# Patient Record
(demographics unavailable — no encounter records)

---

## 2025-01-30 NOTE — CARDIOLOGY SUMMARY
[Today's Date] : [unfilled] [de-identified] : 1/29/25 [FreeTextEntry1] : Sinus rhythm, rate 80 bpm, QRS axis +66 degrees, IA 0.13, QRS 0.10, QTc 0.43 seconds and is within normal limits for age. [de-identified] : 1/29/25 [FreeTextEntry2] : Summary:  1. No structural abnormalities seen. 2. Normal right ventricular morphology with qualitatively normal size and systolic function. 3. Normal left ventricular size, morphology and systolic function. 4. No pericardial effusion. [de-identified] : Drawn [de-identified] : Fasting lipid profile, CMP, CRP, LPA, homocysteine level

## 2025-01-30 NOTE — HISTORY OF PRESENT ILLNESS
[FreeTextEntry1] : I had the opportunity to examine Juwan, a 15-year-old [facility].  Diagnosis at the time was chest wall pain.  Electrocardiogram revealed a sinus rhythm with complete right bundle branch block.  Measurements of acute phase reactants and Troponin was normal.  RVP was normal.  The CBC demonstrated mild elevation of white count.  He is allergic CMP was within normal limits as well.  He is referred here for cardiac evaluation.  He was monitored in [place] after a full-term pregnancy normal spontaneous vaginal delivery with birth weight 7 pounds 5 ounces.  There were no  issues.  Father 56 in good health mother 33 in good health there is 2 siblings male ages 4 and 12 in good health.  There is a significant family history however of early death from myocardial infarction in maternal uncles. Maternal grandfather has had 3 myocardial infarctions and had a CABG 7 months ago.  Maternal aunt  at age 58 and his sleep and had an autopsy specifics are unknown but presumably a heart condition.  He is currently in 11th grade and is not athletic.  The ibuprofen given to him for his chest wall tenderness was effective but he no longer has any cardiovascular complaints.  There are no known allergies.

## 2025-01-30 NOTE — CLEARANCE
[May participate in all age appropriate activities.] : ~He/She~ may participate in all age appropriate activities

## 2025-01-30 NOTE — HISTORY OF PRESENT ILLNESS
[FreeTextEntry1] : I had the opportunity to examine Juwan, a 15-year-[facility].  Diagnosis at the time was chest wall pain.  Electrocardiogram revealed a sinus rhythm with complete right bundle branch block.  Measurements of acute phase reactants and Troponin was normal.  RVP was normal.  The CBC demonstrated mild elevation of white count.  He is allergic CMP was within normal limits as well.  He is referred here for cardiac evaluation.  He was monitored in [place] after a full-term pregnancy normal spontaneous vaginal delivery with birth weight 7 pounds 5 ounces.  There were no  issues.  Father 56 in good health mother 33 in good health there is 2 siblings male ages 4 and 12 in good health.  There is a significant family history however of early death from myocardial infarction in maternal uncles. Maternal grandfather has had 3 myocardial infarctions and had a CABG 7 months ago.  Maternal aunt  at age 58 and his sleep and had an autopsy specifics are unknown but presumably a heart condition.  He is currently in 11th grade and is not athletic.  The ibuprofen given to him for his chest wall tenderness was effective but he no longer has any cardiovascular complaints.  There are no known allergies.

## 2025-01-30 NOTE — CONSULT LETTER
[Today's Date] : [unfilled] [Name] : Name: [unfilled] [] : : ~~ [Today's Date:] : [unfilled] [Dear  ___:] : Dear Dr. [unfilled]: [Consult - Single Provider] : Thank you very much for allowing me to participate in the care of this patient. If you have any questions, please do not hesitate to contact me. [Sincerely,] : Sincerely, [FreeTextEntry9] : 1/29/25 [FreeTextEntry4] : Dr. Hadley Mann [FreeTextEntry5] : [address] [FreeTextEntry6] : [place], [place] [address] [FreeTextEntry7] : [phone]  [FreeTextEntry1] : 1/29/25 [de-identified] : Chalo Harvey MD, FAAP, FACC, FAHA Chief Emeritus, Division of Pediatric Cardiology [facility] Professor, Department of Pediatrics, [facility]

## 2025-01-30 NOTE — CARDIOLOGY SUMMARY
[Today's Date] : [unfilled] [de-identified] : 1/29/25 [FreeTextEntry1] : Sinus rhythm, rate 80 bpm, QRS axis +66 degrees, CO 0.13, QRS 0.10, QTc 0.43 seconds and is within normal limits for age. [de-identified] : 1/29/25 [FreeTextEntry2] : Summary:  1. No structural abnormalities seen. 2. Normal right ventricular morphology with qualitatively normal size and systolic function. 3. Normal left ventricular size, morphology and systolic function. 4. No pericardial effusion. [de-identified] : Drawn [de-identified] : Fasting lipid profile, CMP, CRP, LPA, homocysteine level

## 2025-01-30 NOTE — REASON FOR VISIT
[Initial Consultation] : an initial consultation for [Mother] : mother [FreeTextEntry3] : Hospital follow-up for chest pain and possible pericarditis

## 2025-01-30 NOTE — CONSULT LETTER
[Today's Date] : [unfilled] [Name] : Name: [unfilled] [] : : ~~ [Today's Date:] : [unfilled] [Dear  ___:] : Dear Dr. [unfilled]: [Consult - Single Provider] : Thank you very much for allowing me to participate in the care of this patient. If you have any questions, please do not hesitate to contact me. [Sincerely,] : Sincerely, [FreeTextEntry9] : 1/29/25 [FreeTextEntry4] : Dr. Hadley Mann [FreeTextEntry5] : [address] [FreeTextEntry6] : [place], [place] [address] [FreeTextEntry7] : [phone]  [FreeTextEntry1] : 1/29/25 [de-identified] : Chaol Harvey MD, FAAP, FACC, FAHA Chief Emeritus, Division of Pediatric Cardiology [facility] Professor, Department of Pediatrics, [facility]

## 2025-01-30 NOTE — DISCUSSION/SUMMARY
[May participate in all age-appropriate activities] : [unfilled] May participate in all age-appropriate activities. [Needs SBE Prophylaxis] : [unfilled] does not need bacterial endocarditis prophylaxis [FreeTextEntry1] : Await blood work; consider genetics consultation in light of family history; follow-up as needed